# Patient Record
Sex: FEMALE
[De-identification: names, ages, dates, MRNs, and addresses within clinical notes are randomized per-mention and may not be internally consistent; named-entity substitution may affect disease eponyms.]

---

## 2021-04-06 PROBLEM — Z00.00 ENCOUNTER FOR PREVENTIVE HEALTH EXAMINATION: Status: ACTIVE | Noted: 2021-04-06

## 2021-04-08 ENCOUNTER — APPOINTMENT (OUTPATIENT)
Dept: OTOLARYNGOLOGY | Facility: CLINIC | Age: 66
End: 2021-04-08
Payer: MEDICARE

## 2021-04-08 ENCOUNTER — TRANSCRIPTION ENCOUNTER (OUTPATIENT)
Age: 66
End: 2021-04-08

## 2021-04-08 VITALS — BODY MASS INDEX: 28.25 KG/M2 | WEIGHT: 180 LBS | HEIGHT: 67 IN | TEMPERATURE: 98.7 F

## 2021-04-08 DIAGNOSIS — Z80.9 FAMILY HISTORY OF MALIGNANT NEOPLASM, UNSPECIFIED: ICD-10-CM

## 2021-04-08 DIAGNOSIS — Z78.9 OTHER SPECIFIED HEALTH STATUS: ICD-10-CM

## 2021-04-08 DIAGNOSIS — Z86.79 PERSONAL HISTORY OF OTHER DISEASES OF THE CIRCULATORY SYSTEM: ICD-10-CM

## 2021-04-08 DIAGNOSIS — Z87.891 PERSONAL HISTORY OF NICOTINE DEPENDENCE: ICD-10-CM

## 2021-04-08 PROCEDURE — 99203 OFFICE O/P NEW LOW 30 MIN: CPT | Mod: 25

## 2021-04-08 PROCEDURE — 31575 DIAGNOSTIC LARYNGOSCOPY: CPT

## 2021-04-08 RX ORDER — METFORMIN HYDROCHLORIDE 625 MG/1
TABLET ORAL
Refills: 0 | Status: ACTIVE | COMMUNITY

## 2021-04-08 RX ORDER — RALOXIFENE HYDROCHLORIDE 60 MG/1
TABLET ORAL
Refills: 0 | Status: ACTIVE | COMMUNITY

## 2021-04-08 RX ORDER — UBIDECARENONE/VIT E ACET 100MG-5
CAPSULE ORAL
Refills: 0 | Status: ACTIVE | COMMUNITY

## 2021-04-08 RX ORDER — BENAZEPRIL HYDROCHLORIDE 5 MG/1
TABLET ORAL
Refills: 0 | Status: ACTIVE | COMMUNITY

## 2021-04-08 RX ORDER — ATORVASTATIN CALCIUM 80 MG/1
TABLET, FILM COATED ORAL
Refills: 0 | Status: ACTIVE | COMMUNITY

## 2021-04-08 NOTE — HISTORY OF PRESENT ILLNESS
[de-identified] : Initial visit.\par Her chief complaint is "cough, postnasal drip". \par \par She reports that this problem developed spontaneously in March of 2020.\par It is persistent.\par She was evaluated by an ear nose and throat doctor who recommended a trial of Astelin. She also had a GI workup.She was recommended to excellent.\par She was also recommended upper GI endoscopy which he is not had yet.\par \par A chest x-ray that is not available for review. It was reported as "old granulomatous disease". Her primary care doctor now not feel that further workup or treatment was required.\par \par She does not additional history of\par She is not complaint hemoptysis, unexplained weight loss, change in voice or dyspnea exertion.

## 2021-04-08 NOTE — ASSESSMENT
[FreeTextEntry1] : Differential diagnosis includes but is not limited to LP R., vasomotor rhinitis allergic, pulmonary or less likely neurogenic cough.\par I will recommend a 68 week trial excellent to be taken before breakfast and Pepcid before bed.\par Patient education materials LP R. were provided.\par Pending her clinical course further workup and/or pulmonary workup may be appropriate.

## 2021-04-08 NOTE — PROCEDURE
[de-identified] : PROCEDURE: Flexible laryngoscopy\par SURGEON: Dr. Rivera\par INDICATIONS: He was unable to tolerate a mirror exam. Assess for laryngopharynx pharyngeal reflux. cough. head and neck mass. \par ANESTHESIA: The patient was placed in a sitting position.  Following application of the topical anesthetic and decongestant, exam was performed with a flexible scope.  The scope was passed along the right nasal floor to the nasopharynx.  It was then passed into the region of the middle meatus, middle turbinate, and sphenoethmoid region.  An identical procedure was performed on the left side.  The following findings were noted:\par \par The nasal musoca was healthy appearing and the septum was roughly midline. The middle meatus and sphenoethmoid recesses were clear bilaterally. The nasopharynx \par \par Nasopharynx: no masses, choanae patent, no adenoid tissue\par \par Base of tongue/vallecula: no masses or asymmetry\par Pharyngeal walls: symmetrical. No masses.\par Pyriform sinuses: no lesions or pooling of secretions.\par Epiglottis: normal. No edema or lesions.\par Aryepiglottic folds: normal. No lesions. \par Vocal cords: clear and mobile. No lesions. Airway patent.\par Arytenoids: no edema or erythema. \par Interarytenoid area: no edema, erythema or lesion.\par

## 2021-06-17 ENCOUNTER — APPOINTMENT (OUTPATIENT)
Dept: OTOLARYNGOLOGY | Facility: CLINIC | Age: 66
End: 2021-06-17
Payer: MEDICARE

## 2021-06-17 VITALS — WEIGHT: 180 LBS | TEMPERATURE: 98.7 F | HEIGHT: 67 IN | BODY MASS INDEX: 28.25 KG/M2

## 2021-06-17 DIAGNOSIS — R09.89 OTHER SPECIFIED SYMPTOMS AND SIGNS INVOLVING THE CIRCULATORY AND RESPIRATORY SYSTEMS: ICD-10-CM

## 2021-06-17 PROCEDURE — 99213 OFFICE O/P EST LOW 20 MIN: CPT

## 2021-06-17 RX ORDER — OLMESARTAN MEDOXOMIL 40 MG/1
TABLET, FILM COATED ORAL
Refills: 0 | Status: ACTIVE | COMMUNITY

## 2021-06-18 PROBLEM — R09.89 GLOBUS SENSATION: Status: ACTIVE | Noted: 2021-04-08

## 2021-06-18 NOTE — ASSESSMENT
[FreeTextEntry1] : Clinically she is improving.\par She will continue with her current regiment of tx.\par I had given her Dr. Jennings info for GI workup she has not pursued this yet.\par fu in 2 months.

## 2021-06-18 NOTE — HISTORY OF PRESENT ILLNESS
[de-identified] : Initial visit.\par Her chief complaint is "cough, postnasal drip". \par \par She reports that this problem developed spontaneously in March of 2020.\par It is persistent.\par She was evaluated by an ear nose and throat doctor who recommended a trial of Astelin. She also had a GI workup.She was recommended to excellent.\par She was also recommended upper GI endoscopy which he is not had yet.\par \par A chest x-ray that is not available for review. It was reported as "old granulomatous disease". Her primary care doctor now not feel that further workup or treatment was required.\par \par She does not additional history of\par She is not complaint hemoptysis, unexplained weight loss, change in voice or dyspnea exertion. [FreeTextEntry1] : 06/17/2021 \par Patient is being seen for a follow up visit for her cough. Patient reports of being on dexilant for 6 weeks. Patient reports that her cough is somewhat better.

## 2021-09-09 ENCOUNTER — APPOINTMENT (OUTPATIENT)
Dept: OTOLARYNGOLOGY | Facility: CLINIC | Age: 66
End: 2021-09-09
Payer: MEDICARE

## 2021-09-09 VITALS — TEMPERATURE: 96.7 F | WEIGHT: 180 LBS | HEIGHT: 67 IN | BODY MASS INDEX: 28.25 KG/M2

## 2021-09-09 DIAGNOSIS — R05 COUGH: ICD-10-CM

## 2021-09-09 PROCEDURE — 99213 OFFICE O/P EST LOW 20 MIN: CPT

## 2021-09-09 NOTE — HISTORY OF PRESENT ILLNESS
[de-identified] : She is well known to me.\par She reports that her cough is completely resolved.\par Infectious uptake index went a few weeks ago.\par She continues to take Pepcid before bed.\par \par She had EGD which was normal.

## 2021-09-09 NOTE — ASSESSMENT
[FreeTextEntry1] : Clinically stable.\par She will continue to discontinued excellent.\par She will also wean herself and Pepcid.\par She will followup with me on an as-needed basis.

## 2022-11-03 ENCOUNTER — APPOINTMENT (OUTPATIENT)
Dept: OTOLARYNGOLOGY | Facility: CLINIC | Age: 67
End: 2022-11-03

## 2022-11-03 VITALS — WEIGHT: 180 LBS | TEMPERATURE: 97.6 F | BODY MASS INDEX: 28.25 KG/M2 | HEIGHT: 67 IN

## 2022-11-03 DIAGNOSIS — H93.A2 PULSATILE TINNITUS, LEFT EAR: ICD-10-CM

## 2022-11-03 PROCEDURE — 92557 COMPREHENSIVE HEARING TEST: CPT

## 2022-11-03 PROCEDURE — 99213 OFFICE O/P EST LOW 20 MIN: CPT

## 2022-11-03 PROCEDURE — 92567 TYMPANOMETRY: CPT

## 2022-11-03 RX ORDER — DEXLANSOPRAZOLE 60 MG/1
60 CAPSULE, DELAYED RELEASE ORAL DAILY
Qty: 30 | Refills: 1 | Status: COMPLETED | COMMUNITY
Start: 2021-04-08 | End: 2022-11-03

## 2022-11-03 NOTE — HISTORY OF PRESENT ILLNESS
[de-identified] : She is very well-known to me.\par She presents today for evaluation of tinnitus in her left ear.\par She reports that this developed spontaneously in May.\par She reports that it comes and goes.\par Intensity worse in recumbent position.\par At baseline she feels that her hearing has worsened over the last several years.\par No history of chronic ear problems

## 2022-11-03 NOTE — ASSESSMENT
[FreeTextEntry1] : She has pulsatile tinnitus.  I explained her that this could represent a phenomenon for her age-appropriate hearing loss.  There is slight mid frequency asymmetry in the left ear.  It does not meet criteria for retrocochlear work-up.\par We talked about further work-up including MRI, MRA and MRV for pulsatile tinnitus.  She is not motivated.\par She is a candidate for hearing aid amplification.\par She is medically cleared and will be seen at our hearing center.

## 2022-11-18 ENCOUNTER — APPOINTMENT (OUTPATIENT)
Dept: OTOLARYNGOLOGY | Facility: CLINIC | Age: 67
End: 2022-11-18

## 2022-11-18 PROCEDURE — V5010 ASSESSMENT FOR HEARING AID: CPT

## 2023-04-11 ENCOUNTER — APPOINTMENT (OUTPATIENT)
Dept: OTOLARYNGOLOGY | Facility: CLINIC | Age: 68
End: 2023-04-11
Payer: SELF-PAY

## 2023-04-11 PROCEDURE — V5010 ASSESSMENT FOR HEARING AID: CPT | Mod: NC

## 2023-04-12 ENCOUNTER — NON-APPOINTMENT (OUTPATIENT)
Age: 68
End: 2023-04-12

## 2023-04-21 ENCOUNTER — APPOINTMENT (OUTPATIENT)
Dept: OTOLARYNGOLOGY | Facility: CLINIC | Age: 68
End: 2023-04-21

## 2023-04-25 ENCOUNTER — APPOINTMENT (OUTPATIENT)
Dept: OTOLARYNGOLOGY | Facility: CLINIC | Age: 68
End: 2023-04-25

## 2023-05-05 ENCOUNTER — APPOINTMENT (OUTPATIENT)
Dept: OTOLARYNGOLOGY | Facility: CLINIC | Age: 68
End: 2023-05-05

## 2023-05-11 ENCOUNTER — APPOINTMENT (OUTPATIENT)
Dept: OTOLARYNGOLOGY | Facility: CLINIC | Age: 68
End: 2023-05-11

## 2023-06-01 ENCOUNTER — NON-APPOINTMENT (OUTPATIENT)
Age: 68
End: 2023-06-01

## 2024-03-26 ENCOUNTER — NON-APPOINTMENT (OUTPATIENT)
Age: 69
End: 2024-03-26

## 2024-04-18 ENCOUNTER — APPOINTMENT (OUTPATIENT)
Dept: OTOLARYNGOLOGY | Facility: CLINIC | Age: 69
End: 2024-04-18
Payer: SELF-PAY

## 2024-04-18 ENCOUNTER — APPOINTMENT (OUTPATIENT)
Dept: OTOLARYNGOLOGY | Facility: CLINIC | Age: 69
End: 2024-04-18
Payer: MEDICARE

## 2024-04-18 DIAGNOSIS — H90.3 SENSORINEURAL HEARING LOSS, BILATERAL: ICD-10-CM

## 2024-04-18 PROCEDURE — V5261E: CUSTOM

## 2024-04-18 PROCEDURE — 99213 OFFICE O/P EST LOW 20 MIN: CPT

## 2024-04-18 PROCEDURE — 92567 TYMPANOMETRY: CPT

## 2024-04-18 PROCEDURE — 92557 COMPREHENSIVE HEARING TEST: CPT

## 2024-04-22 PROBLEM — H90.3 SENSORINEURAL HEARING LOSS (SNHL) OF BOTH EARS: Status: ACTIVE | Noted: 2022-11-03

## 2024-04-22 NOTE — HISTORY OF PRESENT ILLNESS
[de-identified] : She continues to have concerns with hearing. She had considered pursuing hearing aids in the past and did not follow through. At this point she is motivated to proceed with hearing aid amplification.

## 2024-04-22 NOTE — ASSESSMENT
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 She is cleared for hearing aid amplification. She will be seen at the hearing center today.

## 2024-04-30 ENCOUNTER — APPOINTMENT (OUTPATIENT)
Dept: OTOLARYNGOLOGY | Facility: CLINIC | Age: 69
End: 2024-04-30
Payer: SELF-PAY

## 2024-04-30 PROCEDURE — 92593: CPT | Mod: NC

## 2025-09-11 ENCOUNTER — APPOINTMENT (OUTPATIENT)
Dept: OTOLARYNGOLOGY | Facility: CLINIC | Age: 70
End: 2025-09-11